# Patient Record
Sex: MALE | NOT HISPANIC OR LATINO | Employment: FULL TIME | URBAN - METROPOLITAN AREA
[De-identification: names, ages, dates, MRNs, and addresses within clinical notes are randomized per-mention and may not be internally consistent; named-entity substitution may affect disease eponyms.]

---

## 2019-11-24 ENCOUNTER — HOSPITAL ENCOUNTER (EMERGENCY)
Facility: HOSPITAL | Age: 34
Discharge: HOME | End: 2019-11-24
Attending: EMERGENCY MEDICINE | Admitting: EMERGENCY MEDICINE

## 2019-11-24 ENCOUNTER — APPOINTMENT (OUTPATIENT)
Dept: GENERAL RADIOLOGY | Facility: HOSPITAL | Age: 34
End: 2019-11-24

## 2019-11-24 ENCOUNTER — APPOINTMENT (OUTPATIENT)
Dept: ULTRASOUND IMAGING | Facility: HOSPITAL | Age: 34
End: 2019-11-24

## 2019-11-24 VITALS
WEIGHT: 175.5 LBS | BODY MASS INDEX: 26 KG/M2 | SYSTOLIC BLOOD PRESSURE: 97 MMHG | OXYGEN SATURATION: 100 % | HEIGHT: 69 IN | RESPIRATION RATE: 18 BRPM | HEART RATE: 93 BPM | TEMPERATURE: 98.1 F | DIASTOLIC BLOOD PRESSURE: 81 MMHG

## 2019-11-24 DIAGNOSIS — E87.1 HYPONATREMIA: ICD-10-CM

## 2019-11-24 DIAGNOSIS — N43.40 SPERMATOCELE OF EPIDIDYMIS: Primary | ICD-10-CM

## 2019-11-24 DIAGNOSIS — R79.89 ELEVATED SERUM CREATININE: ICD-10-CM

## 2019-11-24 LAB
ANION GAP SERPL CALCULATED.3IONS-SCNC: 12.3 MMOL/L (ref 5–15)
BASOPHILS # BLD AUTO: 0.04 10*3/MM3 (ref 0–0.2)
BASOPHILS NFR BLD AUTO: 0.4 % (ref 0–1.5)
BILIRUB UR QL STRIP: NEGATIVE
BUN BLD-MCNC: 11 MG/DL (ref 6–20)
BUN/CREAT SERPL: 8.4 (ref 7–25)
CALCIUM SPEC-SCNC: 9.5 MG/DL (ref 8.6–10.5)
CHLORIDE SERPL-SCNC: 94 MMOL/L (ref 98–107)
CLARITY UR: CLEAR
CO2 SERPL-SCNC: 26.7 MMOL/L (ref 22–29)
COLOR UR: YELLOW
CREAT BLD-MCNC: 1.31 MG/DL (ref 0.76–1.27)
DEPRECATED RDW RBC AUTO: 45.9 FL (ref 37–54)
EOSINOPHIL # BLD AUTO: 0.02 10*3/MM3 (ref 0–0.4)
EOSINOPHIL NFR BLD AUTO: 0.2 % (ref 0.3–6.2)
ERYTHROCYTE [DISTWIDTH] IN BLOOD BY AUTOMATED COUNT: 13.1 % (ref 12.3–15.4)
GFR SERPL CREATININE-BSD FRML MDRD: 63 ML/MIN/1.73
GFR SERPL CREATININE-BSD FRML MDRD: 76 ML/MIN/1.73
GLUCOSE BLD-MCNC: 99 MG/DL (ref 65–99)
GLUCOSE UR STRIP-MCNC: NEGATIVE MG/DL
HCT VFR BLD AUTO: 43.2 % (ref 37.5–51)
HGB BLD-MCNC: 15.2 G/DL (ref 13–17.7)
HGB UR QL STRIP.AUTO: NEGATIVE
IMM GRANULOCYTES # BLD AUTO: 0.08 10*3/MM3 (ref 0–0.05)
IMM GRANULOCYTES NFR BLD AUTO: 0.8 % (ref 0–0.5)
KETONES UR QL STRIP: ABNORMAL
LEUKOCYTE ESTERASE UR QL STRIP.AUTO: NEGATIVE
LYMPHOCYTES # BLD AUTO: 0.62 10*3/MM3 (ref 0.7–3.1)
LYMPHOCYTES NFR BLD AUTO: 6.3 % (ref 19.6–45.3)
MCH RBC QN AUTO: 33.6 PG (ref 26.6–33)
MCHC RBC AUTO-ENTMCNC: 35.2 G/DL (ref 31.5–35.7)
MCV RBC AUTO: 95.4 FL (ref 79–97)
MONOCYTES # BLD AUTO: 0.49 10*3/MM3 (ref 0.1–0.9)
MONOCYTES NFR BLD AUTO: 5 % (ref 5–12)
NEUTROPHILS # BLD AUTO: 8.58 10*3/MM3 (ref 1.7–7)
NEUTROPHILS NFR BLD AUTO: 87.3 % (ref 42.7–76)
NITRITE UR QL STRIP: NEGATIVE
NRBC BLD AUTO-RTO: 0 /100 WBC (ref 0–0.2)
PH UR STRIP.AUTO: 6.5 [PH] (ref 5–8)
PLATELET # BLD AUTO: 268 10*3/MM3 (ref 140–450)
PMV BLD AUTO: 9 FL (ref 6–12)
POTASSIUM BLD-SCNC: 4.2 MMOL/L (ref 3.5–5.2)
PROT UR QL STRIP: ABNORMAL
RBC # BLD AUTO: 4.53 10*6/MM3 (ref 4.14–5.8)
SODIUM BLD-SCNC: 133 MMOL/L (ref 136–145)
SP GR UR STRIP: >=1.03 (ref 1–1.03)
UROBILINOGEN UR QL STRIP: ABNORMAL
WBC NRBC COR # BLD: 9.83 10*3/MM3 (ref 3.4–10.8)

## 2019-11-24 PROCEDURE — 81003 URINALYSIS AUTO W/O SCOPE: CPT | Performed by: EMERGENCY MEDICINE

## 2019-11-24 PROCEDURE — 85025 COMPLETE CBC W/AUTO DIFF WBC: CPT | Performed by: EMERGENCY MEDICINE

## 2019-11-24 PROCEDURE — 99283 EMERGENCY DEPT VISIT LOW MDM: CPT

## 2019-11-24 PROCEDURE — 72110 X-RAY EXAM L-2 SPINE 4/>VWS: CPT

## 2019-11-24 PROCEDURE — 76870 US EXAM SCROTUM: CPT

## 2019-11-24 PROCEDURE — 25010000002 KETOROLAC TROMETHAMINE PER 15 MG: Performed by: EMERGENCY MEDICINE

## 2019-11-24 PROCEDURE — 87491 CHLMYD TRACH DNA AMP PROBE: CPT | Performed by: EMERGENCY MEDICINE

## 2019-11-24 PROCEDURE — 87591 N.GONORRHOEAE DNA AMP PROB: CPT | Performed by: EMERGENCY MEDICINE

## 2019-11-24 PROCEDURE — 93976 VASCULAR STUDY: CPT

## 2019-11-24 PROCEDURE — 96374 THER/PROPH/DIAG INJ IV PUSH: CPT

## 2019-11-24 PROCEDURE — 80048 BASIC METABOLIC PNL TOTAL CA: CPT | Performed by: EMERGENCY MEDICINE

## 2019-11-24 RX ORDER — IBUPROFEN 800 MG/1
800 TABLET ORAL EVERY 6 HOURS PRN
Qty: 20 TABLET | Refills: 0 | Status: SHIPPED | OUTPATIENT
Start: 2019-11-24 | End: 2019-11-24

## 2019-11-24 RX ORDER — KETOROLAC TROMETHAMINE 15 MG/ML
15 INJECTION, SOLUTION INTRAMUSCULAR; INTRAVENOUS ONCE
Status: COMPLETED | OUTPATIENT
Start: 2019-11-24 | End: 2019-11-24

## 2019-11-24 RX ORDER — SODIUM CHLORIDE 0.9 % (FLUSH) 0.9 %
10 SYRINGE (ML) INJECTION AS NEEDED
Status: DISCONTINUED | OUTPATIENT
Start: 2019-11-24 | End: 2019-11-24 | Stop reason: HOSPADM

## 2019-11-24 RX ADMIN — KETOROLAC TROMETHAMINE 15 MG: 15 INJECTION, SOLUTION INTRAMUSCULAR; INTRAVENOUS at 11:45

## 2019-11-24 NOTE — ED NOTES
Patient states that he has been having right sided testicular pain for 2 weeks now that is a squeezing pain. Patient denies fever, and Nausea/vomiting. Patient states that he is also having right sided flank pain for 2 weeks. Patient denies history of kidney stones. Patient appears restless at this time.      Elda Babb RN  11/24/19 0901

## 2019-11-24 NOTE — ED PROVIDER NOTES
" EMERGENCY DEPARTMENT ENCOUNTER    Room Number:  13/13  Date of encounter:  11/24/2019  PCP: Provider, No Known  Historian: Patient      HPI:  Chief Complaint: Testicular pain  A complete HPI/ROS/PMH/PSH/SH/FH are unobtainable due to: None    Context: Yimi Corrigan is a 34 y.o. male who presents to the ED c/o testicular pain.  Onset 2 weeks ago.  It is right-sided.  It is constant.  It is sharp and throbbing in character.  Worsens by nothing.  Improved by nothing.  Denies having associated penile discharge.  No fever.  No abdominal pain.  He reports having right flank pain.  He reports having one sexual partner.  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      PAST MEDICAL HISTORY  Active Ambulatory Problems     Diagnosis Date Noted   • No Active Ambulatory Problems     Resolved Ambulatory Problems     Diagnosis Date Noted   • No Resolved Ambulatory Problems     Past Medical History:   Diagnosis Date   • Arthritis          PAST SURGICAL HISTORY  Past Surgical History:   Procedure Laterality Date   • CYST REMOVAL           FAMILY HISTORY  History reviewed. No pertinent family history.      SOCIAL HISTORY  Social History     Socioeconomic History   • Marital status: Single     Spouse name: Not on file   • Number of children: Not on file   • Years of education: Not on file   • Highest education level: Not on file   Tobacco Use   • Smoking status: Current Every Day Smoker     Packs/day: 0.50     Types: Cigarettes   • Smokeless tobacco: Current User     Types: Chew   Substance and Sexual Activity   • Alcohol use: Yes     Comment: \"Socially\"    • Drug use: Yes     Types: Marijuana     Comment: \"I have my medical card\"   • Sexual activity: Yes     Partners: Female         ALLERGIES  Patient has no known allergies.        REVIEW OF SYSTEMS  Review of Systems     All systems reviewed and negative except for those discussed in HPI.       PHYSICAL EXAM    I have reviewed the triage vital " signs and nursing notes.    ED Triage Vitals   Temp Heart Rate Resp BP SpO2   11/24/19 0922 11/24/19 0923 11/24/19 0922 -- 11/24/19 0923   98.1 °F (36.7 °C) 101 18  97 %      Temp src Heart Rate Source Patient Position BP Location FiO2 (%)   11/24/19 0922 -- -- -- --   Tympanic           Physical Exam  GENERAL: not distressed  HENT: nares patent  EYES: no scleral icterus  CV: regular rhythm, regular rate  RESPIRATORY: normal effort  ABDOMEN: soft, nontender  : Normal testicular lie, normal external genitalia, right epididymis tenderness  MUSCULOSKELETAL: no deformity, no midline spinal tenderness, right CVA tenderness  NEURO: alert, moves all extremities, follows commands  SKIN: warm, dry        LAB RESULTS  Recent Results (from the past 24 hour(s))   Basic Metabolic Panel    Collection Time: 11/24/19  9:55 AM   Result Value Ref Range    Glucose 99 65 - 99 mg/dL    BUN 11 6 - 20 mg/dL    Creatinine 1.31 (H) 0.76 - 1.27 mg/dL    Sodium 133 (L) 136 - 145 mmol/L    Potassium 4.2 3.5 - 5.2 mmol/L    Chloride 94 (L) 98 - 107 mmol/L    CO2 26.7 22.0 - 29.0 mmol/L    Calcium 9.5 8.6 - 10.5 mg/dL    eGFR  African Amer 76 >60 mL/min/1.73    eGFR Non African Amer 63 >60 mL/min/1.73    BUN/Creatinine Ratio 8.4 7.0 - 25.0    Anion Gap 12.3 5.0 - 15.0 mmol/L   CBC Auto Differential    Collection Time: 11/24/19  9:55 AM   Result Value Ref Range    WBC 9.83 3.40 - 10.80 10*3/mm3    RBC 4.53 4.14 - 5.80 10*6/mm3    Hemoglobin 15.2 13.0 - 17.7 g/dL    Hematocrit 43.2 37.5 - 51.0 %    MCV 95.4 79.0 - 97.0 fL    MCH 33.6 (H) 26.6 - 33.0 pg    MCHC 35.2 31.5 - 35.7 g/dL    RDW 13.1 12.3 - 15.4 %    RDW-SD 45.9 37.0 - 54.0 fl    MPV 9.0 6.0 - 12.0 fL    Platelets 268 140 - 450 10*3/mm3    Neutrophil % 87.3 (H) 42.7 - 76.0 %    Lymphocyte % 6.3 (L) 19.6 - 45.3 %    Monocyte % 5.0 5.0 - 12.0 %    Eosinophil % 0.2 (L) 0.3 - 6.2 %    Basophil % 0.4 0.0 - 1.5 %    Immature Grans % 0.8 (H) 0.0 - 0.5 %    Neutrophils, Absolute 8.58 (H) 1.70  - 7.00 10*3/mm3    Lymphocytes, Absolute 0.62 (L) 0.70 - 3.10 10*3/mm3    Monocytes, Absolute 0.49 0.10 - 0.90 10*3/mm3    Eosinophils, Absolute 0.02 0.00 - 0.40 10*3/mm3    Basophils, Absolute 0.04 0.00 - 0.20 10*3/mm3    Immature Grans, Absolute 0.08 (H) 0.00 - 0.05 10*3/mm3    nRBC 0.0 0.0 - 0.2 /100 WBC   Urinalysis With Microscopic If Indicated (No Culture) - Urine, Clean Catch    Collection Time: 11/24/19 10:04 AM   Result Value Ref Range    Color, UA Yellow Yellow, Straw    Appearance, UA Clear Clear    pH, UA 6.5 5.0 - 8.0    Specific Gravity, UA >=1.030 1.005 - 1.030    Glucose, UA Negative Negative    Ketones, UA Trace (A) Negative    Bilirubin, UA Negative Negative    Blood, UA Negative Negative    Protein, UA Trace (A) Negative    Leuk Esterase, UA Negative Negative    Nitrite, UA Negative Negative    Urobilinogen, UA 1.0 E.U./dL 0.2 - 1.0 E.U./dL       Ordered the above labs and independently reviewed the results.        RADIOLOGY  Us Testicular Or Ovarian Vascular Limited    Result Date: 11/24/2019  US TESTICULAR OR OVARIAN VASCULAR LIMITED-  INDICATIONS: Right scrotal pain  TECHNIQUE: Scrotal ultrasound with Doppler assessment of the testicles  COMPARISON: None available  FINDINGS:  The testicles show normal vascularity and appear unremarkable. The right testis measures 4.5 x 1.9 x 3.1 cm. Left testis measures 4.4 x 1.8 x 3.1 cm.  The right epididymal head measures 1.4 cm. Left epididymal head measures 1.0 cm. A right epididymal cyst measures 2.6 cm, contains internal debris. Also noted, 4 mm right epididymal cyst and 2 mm left epididymal cyst. The epididymides otherwise appear unremarkable, with normal vascularity.  Small bilateral hydroceles. No varicocele.       No evidence for testicular torsion or testicular lesion. Multiple epididymal cysts. Small bilateral hydroceles.  This report was finalized on 11/24/2019 10:58 AM by Dr. Bobo Tovar M.D.      Xr Spine Lumbar Complete 4+vw    Result  Date: 11/24/2019  XR SPINE LUMBAR COMPLETE 4+VW-  INDICATIONS: Pain in the lower back  TECHNIQUE: 5 views of the lumbar spine.  COMPARISON: None available  FINDINGS:  No acute fracture or malalignment is identified. Vertebral body heights are preserved. Mild disc space narrowing at L5/S1.       Mild disc space narrowing at L5/S1. No acute fracture identified. If there is further clinical concern, MRI could be considered for further evaluation.  This report was finalized on 11/24/2019 10:01 AM by Dr. Bobo Tovar M.D.        I ordered the above noted radiological studies. Reviewed by me and discussed with radiologist.  See dictation for official radiology interpretation.      PROCEDURES    Procedures      MEDICATIONS GIVEN IN ER    Medications   sodium chloride 0.9 % flush 10 mL (not administered)   ketorolac (TORADOL) injection 15 mg (15 mg Intravenous Given 11/24/19 1145)         PROGRESS, DATA ANALYSIS, CONSULTS, AND MEDICAL DECISION MAKING    All labs have been independently reviewed by me.  All radiology studies have been reviewed by me and discussed with radiologist dictating the report.   EKG's independently viewed and interpreted by me.  Discussion below represents my analysis of pertinent findings related to patient's condition, differential diagnosis, treatment plan and final disposition.    [place] diagnosis includes testicular torsion, orchitis, epididymitis, hernia, renal colic, appendicitis, STD, UTI.    ED Course as of Nov 24 1412   Sun Nov 24, 2019   1139 Protein, UA: (!) Trace [TD]   1139 Leukocytes, UA: Negative [TD]   1139 Nitrite, UA: Negative [TD]   1139 Creatinine: (!) 1.31 [TD]   1139 Sodium: (!) 133 [TD]   1140 Impression       Mild disc space narrowing at L5/S1. No acute fracture identified. If  there is further clinical concern, MRI could be considered for further  evaluation.      [TD]      ED Course User Index  [TD] Miles Arrington II, MD     Advised to the patient that he  avoid taking NSAIDs.    I spoke with ultrasound who stated the patient has spermatocele but no evidence of testicular torsion or epididymitis.      AS OF 2:12 PM VITALS:    BP - 97/81  HR - 93  TEMP - 98.1 °F (36.7 °C) (Tympanic)  02 SATS - 100%        DIAGNOSIS  Final diagnoses:   Spermatocele of epididymis   Elevated serum creatinine   Hyponatremia         DISPOSITION  DISCHARGE    FOLLOW-UP  PATIENT [facility] [address]  [phone]  Schedule an appointment as soon as possible for a visit   to recheck your sodium and creatinine in 1 week    [facility]  [address] [place], [place] [id number]  [phone]  Schedule an appointment as soon as possible for a visit   if you continue to have testicular pain         Medication List      No changes were made to your prescriptions during this visit.                Miles Arrington II, MD  11/24/19 7888

## 2019-11-24 NOTE — ED TRIAGE NOTES
"Rt testicle pain and rt lower back/flank pain x 2 weeks. 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   "

## 2019-11-27 LAB
C TRACH RRNA SPEC DONR QL NAA+PROBE: NEGATIVE
N GONORRHOEA DNA SPEC QL NAA+PROBE: NEGATIVE